# Patient Record
Sex: FEMALE | Race: WHITE | NOT HISPANIC OR LATINO | Employment: STUDENT | URBAN - NONMETROPOLITAN AREA
[De-identification: names, ages, dates, MRNs, and addresses within clinical notes are randomized per-mention and may not be internally consistent; named-entity substitution may affect disease eponyms.]

---
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
## 2024-11-13 ENCOUNTER — OFFICE VISIT (OUTPATIENT)
Dept: GASTROENTEROLOGY | Facility: CLINIC | Age: 24
End: 2024-11-13
Payer: COMMERCIAL

## 2024-11-13 VITALS
BODY MASS INDEX: 24.91 KG/M2 | WEIGHT: 155 LBS | DIASTOLIC BLOOD PRESSURE: 64 MMHG | SYSTOLIC BLOOD PRESSURE: 122 MMHG | OXYGEN SATURATION: 98 % | HEART RATE: 86 BPM | HEIGHT: 66 IN

## 2024-11-13 DIAGNOSIS — K58.1 IRRITABLE BOWEL SYNDROME WITH CONSTIPATION: ICD-10-CM

## 2024-11-13 DIAGNOSIS — K62.5 RECTAL BLEEDING: Primary | ICD-10-CM

## 2024-11-13 DIAGNOSIS — K62.89 ANAL OR RECTAL PAIN: ICD-10-CM

## 2024-11-13 PROCEDURE — 99244 OFF/OP CNSLTJ NEW/EST MOD 40: CPT | Performed by: PHYSICIAN ASSISTANT

## 2024-11-13 RX ORDER — SODIUM, POTASSIUM,MAG SULFATES 17.5-3.13G
SOLUTION, RECONSTITUTED, ORAL ORAL
Qty: 354 ML | Refills: 0 | Status: SHIPPED | OUTPATIENT
Start: 2024-11-13

## 2024-11-13 RX ORDER — SODIUM CHLORIDE 9 MG/ML
30 INJECTION, SOLUTION INTRAVENOUS CONTINUOUS PRN
OUTPATIENT
Start: 2024-11-13 | End: 2024-11-14

## 2024-11-13 NOTE — PROGRESS NOTES
New Patient Consult      Date: 2024   Patient Name: Trisha Simons  MRN: [id number]  : 2000     Primary Care Provider: Marti Ghosh MD  Referring Provider: Vivian    Chief Complaint   Patient presents with    Diarrhea    Rectal Bleeding     History of Present Illness: Trisha Simons is a 24 y.o. female who is here today as a consultation with Gastroenterology for evaluation of Diarrhea and Rectal Bleeding.    A couple of months ago, she was having rectal bleeding which lasted 2-3 months. This has resolved now. Bleeding was also occurring without any BM, would have blood on her underwear. She noticed worse symptoms with dairy and now avoiding it. Diarrhea is intermittent. She reports long history of abnormal bowel habits but mostly with constipation. Takes magnesium daily. BMs mostly every day but sometimes every 2-3 days. No prior colonoscopy. No known prior history of hemorrhoids. Did have pain sitting when the bleeding was present. Did not have stool tests, had labs with PCP, told all normal.     Was told in the past she could have fatty liver disease. Last imaging , no liver disease noted. She was at 200 lbs last year but lost the weight on her own. Nonalcoholic.     No acid reflux symptoms. No dysphagia. No prior EGD.     Subjective      History reviewed. No pertinent past medical history.    History reviewed. No pertinent surgical history.    Family History   Problem Relation Age of Onset    Cancer Maternal Uncle     Breast cancer Paternal Aunt     Cancer Maternal Grandmother     Breast cancer Paternal Grandmother      Social History     Socioeconomic History    Marital status: Single   Tobacco Use    Smoking status: Never    Smokeless tobacco: Never   Vaping Use    Vaping status: Never Used   Substance and Sexual Activity    Alcohol use: Yes     Comment: occationally    Drug use: Never    Sexual activity: Defer     Current Outpatient Medications:     CALCIUM PO, Take  by  mouth., Disp: , Rfl:     Magnesium 100 MG tablet, Take  by mouth., Disp: , Rfl:     Multiple Minerals (ZINC BALANCE PO), Take  by mouth., Disp: , Rfl:     No Known Allergies    The following portions of the patient's history were reviewed and updated as appropriate: allergies, current medications, past family history, past medical history, past social history, past surgical history and problem list.    Objective     Physical Exam  Vitals reviewed.   Constitutional:       General: She is not in acute distress.     Appearance: Normal appearance. She is well-developed. She is not ill-appearing or diaphoretic.   HENT:      Head: Normocephalic and atraumatic.      Right Ear: External ear normal.      Left Ear: External ear normal.      Nose: Nose normal.   Eyes:      General: No scleral icterus.        Right eye: No discharge.         Left eye: No discharge.      Conjunctiva/sclera: Conjunctivae normal.   Neck:      Vascular: No JVD.   Cardiovascular:      Rate and Rhythm: Normal rate and regular rhythm.      Heart sounds: Normal heart sounds. No murmur heard.     No friction rub. No gallop.   Pulmonary:      Effort: Pulmonary effort is normal. No respiratory distress.      Breath sounds: Normal breath sounds. No wheezing or rales.   Chest:      Chest wall: No tenderness.   Abdominal:      General: Bowel sounds are normal. There is no distension.      Palpations: Abdomen is soft. There is no mass.      Tenderness: There is abdominal tenderness (mild LLQ). There is no guarding.   Musculoskeletal:         General: No deformity. Normal range of motion.      Cervical back: Normal range of motion.   Skin:     General: Skin is warm and dry.      Findings: No erythema or rash.   Neurological:      Mental Status: She is alert and oriented to person, place, and time.      Coordination: Coordination normal.   Psychiatric:         Mood and Affect: Mood normal.         Behavior: Behavior normal.         Thought Content: Thought  "content normal.         Judgment: Judgment normal.       Vitals:    11/13/24 1420   BP: 122/64   Pulse: 86   SpO2: 98%   Weight: 70.3 kg (155 lb)   Height: 167.6 cm (66\")     Results Review:   I have reviewed the patient's new clinical and imaging results.    No recent labs on file to review.     US abd limited 8/2023  1. Normal echogenicity of the liver with no fatty infiltration.   2. Normal gallbladder with no ductal dilation.     Assessment / Plan      1. Rectal bleeding  2. Anal or rectal pain  3. Irritable bowel syndrome with constipation  She had 2-3 months of bright red blood per rectum, occurring often during that time and sometimes without a BM. She does not have a known history of hemorrhoids. Does have long history of fluctuation of bowel habits from constipation to diarrhea. Takes magnesium which helps with mostly constipation. No prior colonoscopy. No recent labs on file. Based on history, suspect she has underlying IBS with rectal outlet bleeding, others to rule out.     Continue to avoid dairy for now  Low FODMAP diet   Colonoscopy will be arranged  Will obtain recent labs from PCP to review    She will have a colonoscopy performed with monitored anesthesia care. The indications, technique, alternatives and potential risk and complications were discussed with the patient including but not limited to bleeding, bowel perforations, missing lesions and anesthetic complications. The patient understands and wishes to proceed with the procedure and has given their verbal consent. Written patient education information was given to the patient. She should follow up in the office after this procedure to discuss the results and further recommendations can be made at that time. The patient will call if they have further questions before procedure.  - Case Request  - sodium-potassium-magnesium sulfates (SUPREP) 17.5-3.13-1.6 GM/177ML solution oral solution; Take 2 bottles by mouth 1 (One) Time for 1 dose. Please " see prep instructions from office  Dispense: 354 mL; Refill: 0      Follow Up:   Return for follow up after procedure to discuss results.    Jaja Piña PA-C  [facility]  11/13/2024  15:58 EST    Dictated Utilizing Dragon Dictation: Part of this note may be an electronic transcription/translation of spoken language to printed text using the Dragon Dictation System.